# Patient Record
Sex: MALE | Race: WHITE
[De-identification: names, ages, dates, MRNs, and addresses within clinical notes are randomized per-mention and may not be internally consistent; named-entity substitution may affect disease eponyms.]

---

## 2019-04-17 ENCOUNTER — HOSPITAL ENCOUNTER (EMERGENCY)
Dept: HOSPITAL 61 - ER | Age: 59
Discharge: HOME | End: 2019-04-17
Payer: COMMERCIAL

## 2019-04-17 VITALS — BODY MASS INDEX: 20.73 KG/M2 | WEIGHT: 140 LBS | HEIGHT: 69 IN

## 2019-04-17 VITALS — SYSTOLIC BLOOD PRESSURE: 158 MMHG | DIASTOLIC BLOOD PRESSURE: 92 MMHG
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
## 2019-04-17 DIAGNOSIS — F10.20: ICD-10-CM
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
## 2019-04-17 DIAGNOSIS — Z88.8: ICD-10-CM

## 2019-04-17 DIAGNOSIS — Z88.1: ICD-10-CM

## 2019-04-17 DIAGNOSIS — S01.81XA: Primary | ICD-10-CM

## 2019-04-17 PROCEDURE — 90715 TDAP VACCINE 7 YRS/> IM: CPT

## 2019-04-17 PROCEDURE — 12002 RPR S/N/AX/GEN/TRNK2.6-7.5CM: CPT

## 2019-04-17 PROCEDURE — 70450 CT HEAD/BRAIN W/O DYE: CPT

## 2019-04-17 PROCEDURE — 72125 CT NECK SPINE W/O DYE: CPT

## 2019-04-17 PROCEDURE — 90471 IMMUNIZATION ADMIN: CPT

## 2019-04-17 PROCEDURE — 12013 RPR F/E/E/N/L/M 2.6-5.0 CM: CPT

## 2019-04-17 PROCEDURE — 99284 EMERGENCY DEPT VISIT MOD MDM: CPT

## 2019-04-17 NOTE — RAD
CT of the head without contrast, 4/17/2019:

 

HISTORY: Fall, head laceration

 

There is mild cerebral and cerebellar atrophy. The ventricles are within 

normal limits in size. There is no shift of the midline structures. There 

is no evidence of acute intracranial hemorrhage or mass effect.

 

IMPRESSION: No acute intracranial abnormality is detected.

 

 

 

CT of the cervical spine without contrast, 4/17/2019:

 

HISTORY: Fall, injury

 

Noncontrast scans were obtained with multiplanar reconstructions produced.

 

There is moderate disc space narrowing and marginal spurring at C5-6. 

There is a lesser degree of marginal spurring at multiple other levels 

with moderate scattered posterior disc bulges. There are mild degenerative

changes involving scattered facet joints bilaterally. The combination of 

findings is causing mild central spinal stenosis at multiple levels as 

well as foraminal stenosis, greatest on the right at C5-6. No acute 

fracture or dislocation is identified. There is moderate calcific plaquing

at the carotid bifurcations. Emphysematous changes are noted in the upper 

lobes.

 

IMPRESSION:

1. Moderate multilevel degenerative change, most severe at the C5-6 level.

2. No acute bony abnormality is detected.

 

Electronically signed by: Rick Moritz, MD (4/17/2019 8:00 AM) [facility]

## 2019-04-17 NOTE — PHYS DOC
Past Medical History


Past Medical History:  No Pertinent History


Past Surgical History:  Other


Additional Past Surgical Histo:  Back Surgery, Knee Surgery, Hand Surgery


Alcohol Use:  Heavy


Drug Use:  None





Adult General


Chief Complaint


Chief Complaint:  MECHANICAL FALL





HPI


HPI





Patient is a 58 year old male who presented to the ER after falling.  Patient 

states that he was getting out of a car when he fell and hit his head.  Patient 

is unsure what he hit his head on and whether or not he lost consciousness.  He 

has a 5 cm laceration to his left temporal region, as well as a skin tear to 

his right temporal region and another skin tear on his left wrist.  He is 

experiencing throbbing pain over his left temporal region with no radiation.  

Patient denies any chest pain or lightheadedness before or after the fall.  He 

denies any focal neurological defects, but does admit to drinking this morning.

  Patient is unsure of his last tetanus shot.





Review of Systems


Review of Systems





Constitutional: Denies fever or chills 


Eyes: Denies change in visual acuity, redness, or eye pain 


HENT: Denies nasal congestion or sore throat 


Respiratory: Denies cough or shortness of breath 


Cardiovascular:Denies chest pain or palpitations


GI: Denies abdominal pain, nausea, vomiting


: Denies dysuria or hematuria 


Musculoskeletal: Reports chronic back pain, no joint pain


Integument: Reports laceration to left temporal region, skin tear to right 

temporal region and right wrist, denies rash


Neurologic: Denies headache, focal weakness or sensory change





Complete systems were reviewed and found to be within normal limits, except as 

documented in this note.





Current Medications


Current Medications





Current Medications








 Medications


  (Trade)  Dose


 Ordered  Sig/Margarita  Start Time


 Stop Time Status Last Admin


Dose Admin


 


 Diphtheria/


 Tetanus/Acell


 Pertussis


  (Boostrix)  0.5 ml  ONCE ONCE  4/17/19 07:00


 4/17/19 07:01 DC 4/17/19 08:03


0.5 ML


 


 Lidocaine/


 Epinephrine


  (LIDOCAINE


 2%-EPI 1:100,000


 multi-dose)  20 ml  1X  ONCE  4/17/19 07:00


 4/17/19 07:01 DC 4/17/19 07:30


20 ML


 


 Neomycin/


 Polymyxin/


 Bacitracin


  (Triple


 Antibiotic


 Ointment)  1 pkt  1X  ONCE  4/17/19 07:00


 4/17/19 07:01 DC 4/17/19 08:03


1 PKT











Allergies


Allergies





Allergies








Coded Allergies Type Severity Reaction Last Updated Verified


 


  cephalexin Allergy Intermediate  4/8/14 Yes


 


  NSAIDS (Non-Steroidal Anti-Inflamma Allergy Mild GI UPSET 4/8/14 Yes











Physical Exam


Physical Exam





Constitutional: No acute distress, non-toxic appearance. 


HENT: Normocephalic, C collar in place, 5 cm laceration to left temporal region

, abrasion to right temporal region, bilateral external ears normal, tympanic 

membranes clear bilateral, no hemotympanum, nose normal, no septal hematoma. 


Eyes: PERRLA, EOMI, conjunctiva normal.


Neck: C collar in place, no midline tenderness, supple. 


Cardiovascular:Heart rate regular rhythm, no murmur 


Lungs & Thorax:  Bilateral breath sounds clear to auscultation 


Abdomen: Bowel sounds normal, soft, no tenderness, no masses, no pulsatile 

masses. 


Skin: 5 cm laceration to left temporal region, abrasions to right temporal 

region and left wrist, no erythema. 


Back: No midline back tenderness, no CVA tenderness. 


Extremities: No tenderness, no cyanosis. 


Neurologic: Alert and oriented X 3, normal motor function, normal sensory 

function, no focal deficits noted. 


Psychologic: Affect normal, mood normal.





Current Patient Data


Vital Signs





 Vital Signs








  Date Time  Temp Pulse Resp B/P (MAP) Pulse Ox O2 Delivery O2 Flow Rate FiO2


 


4/17/19 06:20 97.9 88 18 158/92 (114) 97 Room Air  





 97.9       











EKG


EKG


[]





Radiology/Procedures


Radiology/Procedures


[]





Course & Med Decision Making


Course & Med Decision Making


58 year old male presented to the emergency department after a fall this 

morning.  Patient denied any chest pain or lightheadedness before the fall.  He 

does admit to drinking this morning and is unsure if he had any loss of 

consciousness.  Upon arrival patient was placed in a C collar.  Imaging studies 

obtained and posted to chart.  CT head and neck demonstrated no bleed or 

fracture.  C collar cleared.  Wound cleaned and closed.  Symptomatic treatment 

provided with interval improvement. Advise patient to limit alcohol 

consumption.  Patient was clinically sober. Patient stable for discharge with 

outpatient follow-up with PCP. Discussed findings and plan with patient and 

family, who acknowledge understanding and agreement.





Dragon Disclaimer


Dragon Disclaimer


This electronic medical record was generated, in whole or in part, using a 

voice recognition dictation system.





Laceration/Wound Repair


Laceration/Wound Repair :  


   Wound Location:  face (left temporal)


   Wound's Depth, Shape:  superficial


   Wound Explored:  clean


   Irrigated w/ Saline (ccs):  50


   Betadine Prep?:  No (ChloraPrep)


   Anesthesia:  Lidocaine w/ Epi (2%)


   Volume Anesthetic (ccs):  3


   Wound Debrided:  minimal


   Wound Repaired With:  sutures


   Suture Size/Type:  6:0


   Number of Sutures:  4


   Layer Closure?:  No


   Sterile Dressing Applied?:  Yes


   Splint Applied?:  No


   Sling Applied?:  No


Progress


Verbal consent obtained. Time out performed. Hand hygiene utilized. Wound 

cleaned with ChloraPrep. Anesthesia obtained via a 25-gauge hypodermic needle 

with () mL's of lidocaine 2% with epinephrine. Copious irrigation performed. 

Wound well approximated with (sutures/staples). Patient tolerated procedure 

well and without difficulty. Empiric antibiotic ointment applied prior to 

sterile dressing.





Departure


Departure


Impression:  


 Primary Impression:  


 Forehead laceration


 Additional Impression:  


 Fall


Disposition:  01 HOME, SELF-CARE


Condition:  STABLE


Referrals:  


AMI FIGUEROA MD (PCP)


Patient Instructions:  Fall Prevention and Home Safety, Easy-to-Read, 

Laceration Care, Adult, Easy-to-Read, Sutured Wound Care, Easy-to-Read





Additional Instructions:  


Do not soak your wound.  You may shower.  Clean wound daily with soap and 

water.  Change dressing 2 times daily.  Use over the counter antibiotic 

ointment with each dressing change.





Sutures need to be removed in 5 days. Present to your family doctor or local 

urgent care for removal.  You may also present to the ED but it will be an 

additional visit/charge.





After suture removal you may use Vitamin E ointment to soften the wound and 

prevent scarring.





Problem Qualifiers








 Primary Impression:  


 Forehead laceration


 Encounter type:  initial encounter  Qualified Codes:  S01.81XA - Laceration 

without foreign body of other part of head, initial encounter


 Additional Impression:  


 Fall


 Encounter type:  initial encounter  Qualified Codes:  W19.XXXA - Unspecified 

fall, initial encounter








FIONA WILLIAMSON DO Apr 17, 2019 06:56